# Patient Record
Sex: FEMALE | Race: WHITE | NOT HISPANIC OR LATINO
[De-identification: names, ages, dates, MRNs, and addresses within clinical notes are randomized per-mention and may not be internally consistent; named-entity substitution may affect disease eponyms.]

---

## 2017-02-22 ENCOUNTER — APPOINTMENT (OUTPATIENT)
Dept: PULMONOLOGY | Facility: CLINIC | Age: 57
End: 2017-02-22

## 2017-02-22 VITALS
WEIGHT: 148 LBS | DIASTOLIC BLOOD PRESSURE: 76 MMHG | BODY MASS INDEX: 27.94 KG/M2 | HEIGHT: 61 IN | SYSTOLIC BLOOD PRESSURE: 112 MMHG

## 2017-02-22 VITALS — OXYGEN SATURATION: 98 %

## 2017-02-22 DIAGNOSIS — Z86.79 PERSONAL HISTORY OF OTHER DISEASES OF THE CIRCULATORY SYSTEM: ICD-10-CM

## 2017-02-22 DIAGNOSIS — Z80.1 FAMILY HISTORY OF MALIGNANT NEOPLASM OF TRACHEA, BRONCHUS AND LUNG: ICD-10-CM

## 2017-03-06 ENCOUNTER — FORM ENCOUNTER (OUTPATIENT)
Age: 57
End: 2017-03-06

## 2017-03-07 ENCOUNTER — APPOINTMENT (OUTPATIENT)
Dept: CT IMAGING | Facility: CLINIC | Age: 57
End: 2017-03-07

## 2017-03-07 ENCOUNTER — OUTPATIENT (OUTPATIENT)
Dept: OUTPATIENT SERVICES | Facility: HOSPITAL | Age: 57
LOS: 1 days | End: 2017-03-07
Payer: COMMERCIAL

## 2017-03-07 DIAGNOSIS — Z00.8 ENCOUNTER FOR OTHER GENERAL EXAMINATION: ICD-10-CM

## 2017-03-07 PROCEDURE — G0297: CPT

## 2017-05-01 ENCOUNTER — APPOINTMENT (OUTPATIENT)
Dept: PULMONOLOGY | Facility: CLINIC | Age: 57
End: 2017-05-01

## 2017-05-01 VITALS — OXYGEN SATURATION: 97 % | SYSTOLIC BLOOD PRESSURE: 118 MMHG | HEART RATE: 84 BPM | DIASTOLIC BLOOD PRESSURE: 76 MMHG

## 2017-05-01 VITALS — BODY MASS INDEX: 28.97 KG/M2 | WEIGHT: 153.31 LBS

## 2017-05-01 DIAGNOSIS — R06.09 OTHER FORMS OF DYSPNEA: ICD-10-CM

## 2017-08-04 ENCOUNTER — APPOINTMENT (OUTPATIENT)
Dept: PULMONOLOGY | Facility: CLINIC | Age: 57
End: 2017-08-04
Payer: COMMERCIAL

## 2017-08-04 VITALS — WEIGHT: 170 LBS | BODY MASS INDEX: 32.12 KG/M2 | SYSTOLIC BLOOD PRESSURE: 110 MMHG | DIASTOLIC BLOOD PRESSURE: 78 MMHG

## 2017-08-04 VITALS — SYSTOLIC BLOOD PRESSURE: 112 MMHG | DIASTOLIC BLOOD PRESSURE: 70 MMHG

## 2017-08-04 VITALS — BODY MASS INDEX: 27.96 KG/M2 | WEIGHT: 148 LBS

## 2017-08-04 DIAGNOSIS — R05 COUGH: ICD-10-CM

## 2017-08-04 PROCEDURE — 99214 OFFICE O/P EST MOD 30 MIN: CPT | Mod: 25

## 2017-08-04 PROCEDURE — 94010 BREATHING CAPACITY TEST: CPT

## 2017-08-04 RX ORDER — UMECLIDINIUM BROMIDE AND VILANTEROL TRIFENATATE 62.5; 25 UG/1; UG/1
62.5-25 POWDER RESPIRATORY (INHALATION)
Refills: 0 | Status: DISCONTINUED | COMMUNITY
End: 2017-08-04

## 2017-08-08 ENCOUNTER — MEDICATION RENEWAL (OUTPATIENT)
Age: 57
End: 2017-08-08

## 2017-08-08 RX ORDER — ALBUTEROL SULFATE 90 UG/1
108 (90 BASE) AEROSOL, METERED RESPIRATORY (INHALATION)
Qty: 3 | Refills: 3 | Status: ACTIVE | COMMUNITY
Start: 1900-01-01 | End: 1900-01-01

## 2017-08-14 ENCOUNTER — OTHER (OUTPATIENT)
Age: 57
End: 2017-08-14

## 2017-08-14 RX ORDER — TIOTROPIUM BROMIDE 18 UG/1
18 CAPSULE ORAL; RESPIRATORY (INHALATION) DAILY
Qty: 1 | Refills: 5 | Status: DISCONTINUED | COMMUNITY
Start: 2017-08-04 | End: 2017-08-14

## 2017-08-22 ENCOUNTER — MEDICATION RENEWAL (OUTPATIENT)
Age: 57
End: 2017-08-22

## 2017-12-07 ENCOUNTER — APPOINTMENT (OUTPATIENT)
Dept: PULMONOLOGY | Facility: CLINIC | Age: 57
End: 2017-12-07

## 2018-10-04 ENCOUNTER — APPOINTMENT (OUTPATIENT)
Dept: PULMONOLOGY | Facility: CLINIC | Age: 58
End: 2018-10-04
Payer: COMMERCIAL

## 2018-10-04 VITALS
HEIGHT: 61 IN | HEART RATE: 74 BPM | DIASTOLIC BLOOD PRESSURE: 80 MMHG | OXYGEN SATURATION: 98 % | BODY MASS INDEX: 28.72 KG/M2 | SYSTOLIC BLOOD PRESSURE: 120 MMHG

## 2018-10-04 VITALS — WEIGHT: 152 LBS | BODY MASS INDEX: 28.72 KG/M2

## 2018-10-04 DIAGNOSIS — F17.200 NICOTINE DEPENDENCE, UNSPECIFIED, UNCOMPLICATED: ICD-10-CM

## 2018-10-04 DIAGNOSIS — J40 BRONCHITIS, NOT SPECIFIED AS ACUTE OR CHRONIC: ICD-10-CM

## 2018-10-04 PROCEDURE — 99214 OFFICE O/P EST MOD 30 MIN: CPT | Mod: 25

## 2018-10-04 PROCEDURE — 94010 BREATHING CAPACITY TEST: CPT

## 2018-10-21 ENCOUNTER — FORM ENCOUNTER (OUTPATIENT)
Age: 58
End: 2018-10-21

## 2018-10-22 ENCOUNTER — APPOINTMENT (OUTPATIENT)
Dept: CT IMAGING | Facility: CLINIC | Age: 58
End: 2018-10-22
Payer: COMMERCIAL

## 2018-10-22 ENCOUNTER — OUTPATIENT (OUTPATIENT)
Dept: OUTPATIENT SERVICES | Facility: HOSPITAL | Age: 58
LOS: 1 days | End: 2018-10-22
Payer: COMMERCIAL

## 2018-10-22 DIAGNOSIS — F17.200 NICOTINE DEPENDENCE, UNSPECIFIED, UNCOMPLICATED: ICD-10-CM

## 2018-10-22 PROCEDURE — G0297: CPT | Mod: 26

## 2018-10-22 PROCEDURE — G0297: CPT

## 2019-04-04 ENCOUNTER — APPOINTMENT (OUTPATIENT)
Dept: PULMONOLOGY | Facility: CLINIC | Age: 59
End: 2019-04-04

## 2019-10-21 ENCOUNTER — APPOINTMENT (OUTPATIENT)
Dept: PULMONOLOGY | Facility: CLINIC | Age: 59
End: 2019-10-21
Payer: COMMERCIAL

## 2019-10-21 VITALS
DIASTOLIC BLOOD PRESSURE: 80 MMHG | OXYGEN SATURATION: 98 % | BODY MASS INDEX: 28.47 KG/M2 | HEIGHT: 60 IN | SYSTOLIC BLOOD PRESSURE: 132 MMHG | HEART RATE: 85 BPM | WEIGHT: 145 LBS

## 2019-10-21 PROCEDURE — 94010 BREATHING CAPACITY TEST: CPT

## 2019-10-21 PROCEDURE — 99214 OFFICE O/P EST MOD 30 MIN: CPT | Mod: 25

## 2019-10-21 PROCEDURE — G0296 VISIT TO DETERM LDCT ELIG: CPT

## 2019-10-21 NOTE — REASON FOR VISIT
[Follow-Up] : a follow-up visit [COPD] : COPD [FreeTextEntry2] : nicotine dependence, encounter for lung cancer screening

## 2019-10-21 NOTE — PHYSICAL EXAM
[General Appearance - Well Developed] : well developed [General Appearance - Well Nourished] : well nourished [Normal Conjunctiva] : the conjunctiva exhibited no abnormalities [Normal Oropharynx] : normal oropharynx [Neck Appearance] : the appearance of the neck was normal [Neck Cervical Mass (___cm)] : no neck mass was observed [Jugular Venous Distention Increased] : there was no jugular-venous distention [Heart Rate And Rhythm] : heart rate and rhythm were normal [Heart Sounds] : normal S1 and S2 [Murmurs] : no murmurs present [Arterial Pulses Normal] : the arterial pulses were normal [Edema] : no peripheral edema present [] : no respiratory distress [Respiration, Rhythm And Depth] : normal respiratory rhythm and effort [Exaggerated Use Of Accessory Muscles For Inspiration] : no accessory muscle use [Auscultation Breath Sounds / Voice Sounds] : lungs were clear to auscultation bilaterally [Abdomen Soft] : soft [Abnormal Walk] : normal gait [Nail Clubbing] : no clubbing of the fingernails [Cyanosis, Localized] : no localized cyanosis [Skin Turgor] : normal skin turgor [No Focal Deficits] : no focal deficits [Oriented To Time, Place, And Person] : oriented to person, place, and time

## 2019-10-23 ENCOUNTER — RX RENEWAL (OUTPATIENT)
Age: 59
End: 2019-10-23

## 2019-10-24 ENCOUNTER — RX RENEWAL (OUTPATIENT)
Age: 59
End: 2019-10-24

## 2019-11-06 ENCOUNTER — FORM ENCOUNTER (OUTPATIENT)
Age: 59
End: 2019-11-06

## 2019-11-07 ENCOUNTER — OUTPATIENT (OUTPATIENT)
Dept: OUTPATIENT SERVICES | Facility: HOSPITAL | Age: 59
LOS: 1 days | End: 2019-11-07
Payer: COMMERCIAL

## 2019-11-07 ENCOUNTER — APPOINTMENT (OUTPATIENT)
Dept: CT IMAGING | Facility: CLINIC | Age: 59
End: 2019-11-07
Payer: COMMERCIAL

## 2019-11-07 VITALS — HEIGHT: 60 IN | BODY MASS INDEX: 28.07 KG/M2 | WEIGHT: 143 LBS

## 2019-11-07 DIAGNOSIS — F17.200 NICOTINE DEPENDENCE, UNSPECIFIED, UNCOMPLICATED: ICD-10-CM

## 2019-11-07 PROCEDURE — G0297: CPT | Mod: 26

## 2019-11-07 PROCEDURE — G0297: CPT

## 2019-11-07 NOTE — ASSESSMENT
[Discussed Risks and Advised to Quit Smoking] : Discussed risks and advised to quit smoking [Ready] : Patient is ready for cessation intervention

## 2019-11-07 NOTE — DATA REVIEWED
Called and let patient know [facility] [place] at  [facility].     [Lung Cancer Screening] : Patient underwent lung cancer screening [2] : 2 [TextBox_12] : 03/17 [TextBox_42] : 10/18

## 2019-11-07 NOTE — REASON FOR VISIT
[Annual Follow-Up] : an annual follow-up visit [Face-to-Face Visit] : face-to-face visit [Low-Dose CT Screening Discussion] : low-dose CT lung cancer screening discussion

## 2019-11-07 NOTE — HISTORY OF PRESENT ILLNESS
[TextBox_13] : Referred by Dr. Joe Vargas.\par \par Ms. GARCÍA is a 59 year year old female with a history of HTN, COPD and emphysema.\par \par She was seen in the office today for review of eligibility for, as well as, Low-Dose CT lung cancer screening.  Reviewed and confirmed that the patient meets screening eligibility criteria:\par \par 59 years old \par \par Smoking Status: Current Smoker\par \par Number of pack(s) per day: 1 ppd x 30 years, 3/4 ppd x 10 years\par Number of years smoked: 40\par Number of pack years smokin\par \par \par Ms. GARCÍA denies any symptoms of lung cancer, including new cough, change in cough, hemoptysis, and unintentional weight loss.\par \par Ms. GARCÍA denies any personal history of lung cancer.  Admits to lung cancer in a first degree relative, her father.  Denies any history of occupational exposures.

## 2019-11-07 NOTE — PLAN
[place] : [place] to [place] [phone] [FreeTextEntry1] : - Low Dose CT chest for lung cancer screening.\par \par - Encouraged smoking cessation\par \par \par

## 2019-11-11 ENCOUNTER — MEDICATION RENEWAL (OUTPATIENT)
Age: 59
End: 2019-11-11

## 2021-03-04 ENCOUNTER — APPOINTMENT (OUTPATIENT)
Dept: PULMONOLOGY | Facility: CLINIC | Age: 61
End: 2021-03-04
Payer: COMMERCIAL

## 2021-03-04 VITALS
BODY MASS INDEX: 28.51 KG/M2 | OXYGEN SATURATION: 98 % | HEART RATE: 87 BPM | DIASTOLIC BLOOD PRESSURE: 80 MMHG | WEIGHT: 146 LBS | RESPIRATION RATE: 16 BRPM | SYSTOLIC BLOOD PRESSURE: 140 MMHG

## 2021-03-04 DIAGNOSIS — J31.0 CHRONIC RHINITIS: ICD-10-CM

## 2021-03-04 PROCEDURE — 99072 ADDL SUPL MATRL&STAF TM PHE: CPT

## 2021-03-04 PROCEDURE — G0296 VISIT TO DETERM LDCT ELIG: CPT

## 2021-03-04 PROCEDURE — 99214 OFFICE O/P EST MOD 30 MIN: CPT

## 2021-03-04 NOTE — COUNSELING
[Risk of tobacco use and health benefits of smoking cessation discussed] : Risk of tobacco use and health benefits of smoking cessation discussed [Willing to Quit Smoking] : Willing to quit smoking [ - Annual Lung Cancer Screening/Share Decision Making Discussion] : Annual Lung Cancer Screening/Share Decision Making Discussion. (I have advised this patient to have a Low Dose CT (LDCT) scan of the lungs and have discussed the following with the patient in a shared decision making discussion:   Benefits of Detection and Early Treatment: There is adequate evidence that annual screening for lung cancer with LDCT in a population of high-risk persons can prevent a substantial number of lung cancer–related deaths. The magnitude of benefit depends on the individual patient's risk for lung cancer, as those who are at highest risk are most likely to benefit. Screening cannot prevent most lung cancer–related deaths, and does not replace smoking cessation. Harms of Detection and Early Intervention and Treatment: The harms associated with LDCT screening include false-negative and false-positive results, incidental findings, over diagnosis, and radiation exposure. False-positive LDCT results occur in a substantial proportion of screened persons; 95% of all positive results do not lead to a diagnosis of cancer. In a high-quality screening program, further imaging can resolve most false-positive results; however, some patients may require invasive procedures. Radiation harms, including cancer resulting from cumulative exposure to radiation, vary depending on the age at the start of screening; the number of scans received; and the person's exposure to other sources of radiation, particularly other medical imaging.)

## 2021-03-15 ENCOUNTER — NON-APPOINTMENT (OUTPATIENT)
Age: 61
End: 2021-03-15

## 2021-03-15 VITALS — HEIGHT: 60 IN | BODY MASS INDEX: 28.47 KG/M2 | WEIGHT: 145 LBS

## 2021-03-15 NOTE — HISTORY OF PRESENT ILLNESS
[TextBox_13] : Referred by Dr. Joe Vargas.\par \par Ms. GARCÍA is a 60 year old female with a history of HTN, COPD, high cholesterol and emphysema.\par \par She was called to review eligibility for Low-Dose CT lung cancer screening.  Reviewed and confirmed that the patient meets screening eligibility criteria:\par \par 60 years old \par \par Smoking Status: Current Smoker\par \par Number of pack(s) per day: 1 ppd x 30 years, 3/4 ppd x 12 years\par Number of years smoked: 42\par Number of pack years smokin\par \par Ms. GARCÍA denies any symptoms of lung cancer, including new cough, change in cough, hemoptysis, and unintentional weight loss.\par \par Ms. GARCÍA denies any personal history of lung cancer.  Admits to lung cancer in a first degree relative, her father.  Denies any history of occupational exposures.

## 2021-03-15 NOTE — PLAN
[place] : [place] [phone] [FreeTextEntry1] : - Low Dose CT chest for lung cancer screening.\par \par - Encouraged smoking cessation\par \par

## 2021-03-15 NOTE — DATA REVIEWED
[Lung Cancer Screening] : Patient underwent lung cancer screening [2] : 2 [TextBox_12] : 3/17 [TextBox_27] : 10/18 [TextBox_42] : 11/19

## 2021-03-17 ENCOUNTER — APPOINTMENT (OUTPATIENT)
Dept: CT IMAGING | Facility: CLINIC | Age: 61
End: 2021-03-17
Payer: COMMERCIAL

## 2021-03-17 ENCOUNTER — OUTPATIENT (OUTPATIENT)
Dept: OUTPATIENT SERVICES | Facility: HOSPITAL | Age: 61
LOS: 1 days | End: 2021-03-17
Payer: COMMERCIAL

## 2021-03-17 DIAGNOSIS — Z12.2 ENCOUNTER FOR SCREENING FOR MALIGNANT NEOPLASM OF RESPIRATORY ORGANS: ICD-10-CM

## 2021-03-17 PROCEDURE — 71271 CT THORAX LUNG CANCER SCR C-: CPT | Mod: 26

## 2021-03-17 PROCEDURE — 71271 CT THORAX LUNG CANCER SCR C-: CPT

## 2021-04-19 ENCOUNTER — APPOINTMENT (OUTPATIENT)
Dept: DISASTER EMERGENCY | Facility: CLINIC | Age: 61
End: 2021-04-19

## 2021-04-20 LAB — SARS-COV-2 N GENE NPH QL NAA+PROBE: NOT DETECTED

## 2021-04-22 ENCOUNTER — APPOINTMENT (OUTPATIENT)
Dept: PULMONOLOGY | Facility: CLINIC | Age: 61
End: 2021-04-22
Payer: COMMERCIAL

## 2021-04-22 VITALS
HEART RATE: 80 BPM | DIASTOLIC BLOOD PRESSURE: 88 MMHG | SYSTOLIC BLOOD PRESSURE: 138 MMHG | RESPIRATION RATE: 16 BRPM | OXYGEN SATURATION: 98 %

## 2021-04-22 VITALS — TEMPERATURE: 98.3 F | HEIGHT: 61 IN | BODY MASS INDEX: 27.38 KG/M2 | WEIGHT: 145 LBS

## 2021-04-22 PROCEDURE — 94010 BREATHING CAPACITY TEST: CPT

## 2021-04-22 PROCEDURE — 99213 OFFICE O/P EST LOW 20 MIN: CPT | Mod: 25

## 2021-04-22 PROCEDURE — 99072 ADDL SUPL MATRL&STAF TM PHE: CPT

## 2021-04-22 PROCEDURE — 99406 BEHAV CHNG SMOKING 3-10 MIN: CPT

## 2021-04-22 RX ORDER — ATORVASTATIN CALCIUM 80 MG/1
TABLET, FILM COATED ORAL
Refills: 0 | Status: ACTIVE | COMMUNITY

## 2022-03-18 ENCOUNTER — APPOINTMENT (OUTPATIENT)
Dept: PULMONOLOGY | Facility: CLINIC | Age: 62
End: 2022-03-18
Payer: COMMERCIAL

## 2022-03-18 VITALS — HEIGHT: 60 IN | BODY MASS INDEX: 29.45 KG/M2 | WEIGHT: 150 LBS

## 2022-03-18 VITALS
SYSTOLIC BLOOD PRESSURE: 114 MMHG | HEART RATE: 84 BPM | RESPIRATION RATE: 16 BRPM | DIASTOLIC BLOOD PRESSURE: 76 MMHG | OXYGEN SATURATION: 97 %

## 2022-03-18 PROCEDURE — 99407 BEHAV CHNG SMOKING > 10 MIN: CPT

## 2022-03-18 PROCEDURE — 94010 BREATHING CAPACITY TEST: CPT

## 2022-03-18 PROCEDURE — 99214 OFFICE O/P EST MOD 30 MIN: CPT | Mod: 25

## 2022-03-18 RX ORDER — FLUTICASONE PROPIONATE AND SALMETEROL 250; 50 UG/1; UG/1
250-50 POWDER RESPIRATORY (INHALATION)
Qty: 1 | Refills: 5 | Status: DISCONTINUED | COMMUNITY
Start: 2021-04-22 | End: 2022-03-18

## 2022-03-18 NOTE — CONSULT LETTER
[Dear  ___] : Dear  [unfilled], [FreeTextEntry1] : I had the pleasure of evaluating your patient, HUGO GARCÍA , in the office today.  Please review my consultation and evaluation report that follows below.  Please do not hesitate to call me if further information is necessary or if you wish to discuss ongoing care or diagnostic work-up.   \par I very much appreciate your referral and it is a privilege to be able to provide care for your patient.\par \par Sincerely,\par  \par Huber Stanton MD, MHCM, FACP, AGUEDA-C\par Pulmonary Medicine\par  of Medicine\par [facility]\par [email or web address]\par \par [facility] [place]\par [address]\[place]  [address]\par    Fax \par \par [facility]\par [address]\par [place], [place] \par \par

## 2022-03-18 NOTE — PROCEDURE
[FreeTextEntry1] : Spirometry shows obstruction with FEV1 1.44 69%\par FEV1% 59%\par Midexpir flow 28%\par \par This represents some deterioration from last spiriometry

## 2022-03-18 NOTE — ASSESSMENT
[FreeTextEntry1] : 62 yo woman smoker, with COPD\par Patient of Dr Vargas for 4 years\par Smoker of 30 packyear and still smoking about 1/2 ppd regularly\par \par Here for vicky\par On Breo 200 regularly, denies using albuterol\par \par Spirometry shows obstruction with FEV1 1.44 69%\par FEV1% 59%\par Midexpir flow 28%\par \par This represents some deterioration from last spiriometry\par \par Imp  \par 62 yo woman with COPD, airways obstruction\par Tobacco use\par \par The patient continues to smoke.\par  We had an extensive discussion regarding the multiple potential complications of chronic smoking including pulmonary disease, shortness of breath, emphysema, requirement for oxygen,  pulmonary neoplasm, cardiovascular disease, and cerebrovascular disease. We discussed potential strategies for decreasing smoking and smoking cessation for at least 12 minutes during the patient's visit.\par \par Recommend\par Order this year's lung cancer screening\par Renew meds Breo and albuterol\par encouraged to return her sooner to discuss smoking but she is reluctant\par Wishes to return in one year\par \par

## 2022-03-18 NOTE — HISTORY OF PRESENT ILLNESS
[TextBox_4] : 60 yo woman smoker, with COPD\par Patient of Dr Vargas for 4 years\par Smoker of 30 packyear and still smoking about 1/2 ppd regularly\par \par Here for vicky\par On Breo 200 regularly, denies using albuterol\par

## 2022-03-21 ENCOUNTER — APPOINTMENT (OUTPATIENT)
Dept: SURGERY | Facility: CLINIC | Age: 62
End: 2022-03-21
Payer: COMMERCIAL

## 2022-03-21 VITALS
HEIGHT: 61 IN | TEMPERATURE: 97.5 F | DIASTOLIC BLOOD PRESSURE: 83 MMHG | HEART RATE: 85 BPM | WEIGHT: 148 LBS | SYSTOLIC BLOOD PRESSURE: 149 MMHG | RESPIRATION RATE: 16 BRPM | BODY MASS INDEX: 27.94 KG/M2 | OXYGEN SATURATION: 96 %

## 2022-03-21 DIAGNOSIS — D17.30 BENIGN LIPOMATOUS NEOPLASM OF SKIN AND SUBCUTANEOUS TISSUE OF UNSPECIFIED SITES: ICD-10-CM

## 2022-03-21 DIAGNOSIS — R22.1 LOCALIZED SWELLING, MASS AND LUMP, NECK: ICD-10-CM

## 2022-03-21 PROCEDURE — 99203 OFFICE O/P NEW LOW 30 MIN: CPT

## 2022-03-21 RX ORDER — LOSARTAN POTASSIUM AND HYDROCHLOROTHIAZIDE 12.5; 1 MG/1; MG/1
100-12.5 TABLET ORAL
Qty: 90 | Refills: 0 | Status: ACTIVE | COMMUNITY
Start: 2021-10-11

## 2022-03-21 NOTE — ASSESSMENT
[FreeTextEntry1] : The patient is a 61 year old female with numerous epidermoid cysts of the neck and back.  The patient also has a mass at the base of the left neck in the supraclavicular area that is a likely lipoma.  We are going to proceed with an ultrasound to further assess.  The patient is interested in having the cysts removed.  We will review the ultrasound and schedule accordingly.  A total of 40 minutes was spent coordinating the patient's care.

## 2022-03-21 NOTE — CONSULT LETTER
[Dear  ___] : Dear  [unfilled], [Consult Letter:] : I had the pleasure of evaluating your patient, [unfilled]. [Please see my note below.] : Please see my note below. [Consult Closing:] : Thank you very much for allowing me to participate in the care of this patient.  If you have any questions, please do not hesitate to contact me. [Sincerely,] : Sincerely, [FreeTextEntry3] : Nawaf Orozco MD, FACS\par  \par Department of Surgery\par [facility]\par [facility]\par

## 2022-03-21 NOTE — HISTORY OF PRESENT ILLNESS
[de-identified] : The patient comes to the office in consultation by Dr. Tomasa Barnes for evaluation of numerous cysts of the neck and back.  The patient states that she has 2 cysts of the back that have been steadily getting larger and causing more discomfort.  She also has noted another one along the base of the left neck above the left collar bone. The patient states that she has another smaller one along the front of the left neck.

## 2022-03-28 ENCOUNTER — NON-APPOINTMENT (OUTPATIENT)
Age: 62
End: 2022-03-28

## 2022-03-28 VITALS — BODY MASS INDEX: 27.94 KG/M2 | HEIGHT: 61 IN | WEIGHT: 148 LBS

## 2022-03-28 NOTE — HISTORY OF PRESENT ILLNESS
[TextBox_13] : Referred by Dr. Huber Stanton.\par \par Ms. GARCÍA is a 61 year old female with a history of HTN, COPD and emphysema.\par \par Reviewed and confirmed that the patient meets screening eligibility criteria:\par \par 61 years old \par \par Smoking Status:  Current Smoker\par \par Number of pack(s) per day: 1 ppd x 30 years, 3/4 ppd x 13 years\par Number of years smoked: 43\par Number of pack years smokin.75\par \par No symptoms of lung cancer, including new cough, change in cough, hemoptysis, and unintentional weight loss.\par \par No personal history of lung cancer.  Admits to lung cancer in a first degree relative, her father.  No history of occupational exposures.

## 2022-03-30 ENCOUNTER — APPOINTMENT (OUTPATIENT)
Dept: ULTRASOUND IMAGING | Facility: CLINIC | Age: 62
End: 2022-03-30
Payer: COMMERCIAL

## 2022-03-30 ENCOUNTER — OUTPATIENT (OUTPATIENT)
Dept: OUTPATIENT SERVICES | Facility: HOSPITAL | Age: 62
LOS: 1 days | End: 2022-03-30
Payer: COMMERCIAL

## 2022-03-30 ENCOUNTER — APPOINTMENT (OUTPATIENT)
Dept: CT IMAGING | Facility: CLINIC | Age: 62
End: 2022-03-30
Payer: COMMERCIAL

## 2022-03-30 DIAGNOSIS — Z00.8 ENCOUNTER FOR OTHER GENERAL EXAMINATION: ICD-10-CM

## 2022-03-30 DIAGNOSIS — J44.9 CHRONIC OBSTRUCTIVE PULMONARY DISEASE, UNSPECIFIED: ICD-10-CM

## 2022-03-30 PROCEDURE — 76536 US EXAM OF HEAD AND NECK: CPT | Mod: 26

## 2022-03-30 PROCEDURE — 71271 CT THORAX LUNG CANCER SCR C-: CPT | Mod: 26

## 2022-03-30 PROCEDURE — 71271 CT THORAX LUNG CANCER SCR C-: CPT

## 2022-03-30 PROCEDURE — 76536 US EXAM OF HEAD AND NECK: CPT

## 2022-04-11 ENCOUNTER — APPOINTMENT (OUTPATIENT)
Dept: SURGERY | Facility: CLINIC | Age: 62
End: 2022-04-11
Payer: COMMERCIAL

## 2022-04-11 VITALS
WEIGHT: 150 LBS | RESPIRATION RATE: 16 BRPM | HEART RATE: 87 BPM | BODY MASS INDEX: 28.32 KG/M2 | TEMPERATURE: 97.4 F | SYSTOLIC BLOOD PRESSURE: 144 MMHG | HEIGHT: 61 IN | OXYGEN SATURATION: 95 % | DIASTOLIC BLOOD PRESSURE: 81 MMHG

## 2022-04-11 PROCEDURE — 99214 OFFICE O/P EST MOD 30 MIN: CPT

## 2022-04-11 NOTE — HISTORY OF PRESENT ILLNESS
[de-identified] : The patient returns to the office with no new complaints.  The patient wishes to have the cysts removed.  She has no fevers, chills, or drainage from the cysts.

## 2022-04-11 NOTE — ASSESSMENT
[FreeTextEntry1] : The patient is a 61 year old female with multiple cysts, one of which is overlying a left supraclavicular lipoma.  The patient wishes to have all 4 cysts removed.  The risks, benefits, and alternatives including the option of doing nothing to excision of a 4 skin cysts were discussed.  The potential complications including but not limited to infection, bleeding, wound dehiscence, and possible recurrence of the cysts were discussed.  The patient understands and wishes to proceed at the next available time.  A total of 30 minutes was spent coordinating the patient's care. \par \par \par \par

## 2022-04-11 NOTE — PHYSICAL EXAM
[JVD] : no jugular venous distention  [Purpura] : no purpura  [Petechiae] : no petechiae [Skin Ulcer] : no ulcer [Skin Induration] : no induration [Alert] : alert [Oriented to Person] : oriented to person [Oriented to Place] : oriented to place [Oriented to Time] : oriented to time [Calm] : calm [de-identified] : non toxic, in no acute distress  [de-identified] : NC/AT PERRL EOMI no scleral icterus  [de-identified] : trachea midline, no tenderness there is a mass base of the left neck in the supraclavicular area that appears to be a likely lipoma [de-identified] : no audible wheezing or stridor  [de-identified] : soft, non distended  [de-identified] : FROM of all extremities with no gross deformity or angulation  [de-identified] : there is a 3 cm epidermoid cyst of the upper mid back, a 2 cm cyst of the lower mid back, there is a less than one cm epidermoid cyst overlying the left supraclavicular fossa, there is a 1 cm cyst along the anterior left neck  [de-identified] : mood is calm

## 2022-04-11 NOTE — DATA REVIEWED
[FreeTextEntry1] : independent review of the neck ultrasound reveals a 2 by 4 mm cyst overlying a lipomatous mass in the left supraclavicular area

## 2022-05-04 ENCOUNTER — OUTPATIENT (OUTPATIENT)
Dept: OUTPATIENT SERVICES | Facility: HOSPITAL | Age: 62
LOS: 1 days | End: 2022-05-04
Payer: COMMERCIAL

## 2022-05-04 DIAGNOSIS — Z01.818 ENCOUNTER FOR OTHER PREPROCEDURAL EXAMINATION: ICD-10-CM

## 2022-05-04 LAB
ALBUMIN SERPL ELPH-MCNC: 4.5 G/DL — SIGNIFICANT CHANGE UP (ref 3.3–5.2)
ALP SERPL-CCNC: 98 U/L — SIGNIFICANT CHANGE UP (ref 40–120)
ALT FLD-CCNC: 32 U/L — SIGNIFICANT CHANGE UP
ANION GAP SERPL CALC-SCNC: 14 MMOL/L — SIGNIFICANT CHANGE UP (ref 5–17)
APTT BLD: 29 SEC — SIGNIFICANT CHANGE UP (ref 27.5–35.5)
AST SERPL-CCNC: 32 U/L — HIGH
BASOPHILS # BLD AUTO: 0.11 K/UL — SIGNIFICANT CHANGE UP (ref 0–0.2)
BASOPHILS NFR BLD AUTO: 1.1 % — SIGNIFICANT CHANGE UP (ref 0–2)
BILIRUB SERPL-MCNC: 0.3 MG/DL — LOW (ref 0.4–2)
BUN SERPL-MCNC: 10 MG/DL — SIGNIFICANT CHANGE UP (ref 8–20)
CALCIUM SERPL-MCNC: 9.4 MG/DL — SIGNIFICANT CHANGE UP (ref 8.6–10.2)
CHLORIDE SERPL-SCNC: 99 MMOL/L — SIGNIFICANT CHANGE UP (ref 98–107)
CO2 SERPL-SCNC: 25 MMOL/L — SIGNIFICANT CHANGE UP (ref 22–29)
CREAT SERPL-MCNC: 0.59 MG/DL — SIGNIFICANT CHANGE UP (ref 0.5–1.3)
EGFR: 102 ML/MIN/1.73M2 — SIGNIFICANT CHANGE UP
EOSINOPHIL # BLD AUTO: 0.18 K/UL — SIGNIFICANT CHANGE UP (ref 0–0.5)
EOSINOPHIL NFR BLD AUTO: 1.8 % — SIGNIFICANT CHANGE UP (ref 0–6)
GLUCOSE SERPL-MCNC: 98 MG/DL — SIGNIFICANT CHANGE UP (ref 70–99)
HCT VFR BLD CALC: 45 % — SIGNIFICANT CHANGE UP (ref 34.5–45)
HGB BLD-MCNC: 15.1 G/DL — SIGNIFICANT CHANGE UP (ref 11.5–15.5)
IMM GRANULOCYTES NFR BLD AUTO: 0.3 % — SIGNIFICANT CHANGE UP (ref 0–1.5)
INR BLD: 0.94 RATIO — SIGNIFICANT CHANGE UP (ref 0.88–1.16)
LYMPHOCYTES # BLD AUTO: 2.49 K/UL — SIGNIFICANT CHANGE UP (ref 1–3.3)
LYMPHOCYTES # BLD AUTO: 25.2 % — SIGNIFICANT CHANGE UP (ref 13–44)
MCHC RBC-ENTMCNC: 32.5 PG — SIGNIFICANT CHANGE UP (ref 27–34)
MCHC RBC-ENTMCNC: 33.6 GM/DL — SIGNIFICANT CHANGE UP (ref 32–36)
MCV RBC AUTO: 96.8 FL — SIGNIFICANT CHANGE UP (ref 80–100)
MONOCYTES # BLD AUTO: 0.79 K/UL — SIGNIFICANT CHANGE UP (ref 0–0.9)
MONOCYTES NFR BLD AUTO: 8 % — SIGNIFICANT CHANGE UP (ref 2–14)
NEUTROPHILS # BLD AUTO: 6.28 K/UL — SIGNIFICANT CHANGE UP (ref 1.8–7.4)
NEUTROPHILS NFR BLD AUTO: 63.6 % — SIGNIFICANT CHANGE UP (ref 43–77)
PLATELET # BLD AUTO: 272 K/UL — SIGNIFICANT CHANGE UP (ref 150–400)
POTASSIUM SERPL-MCNC: 4.2 MMOL/L — SIGNIFICANT CHANGE UP (ref 3.5–5.3)
POTASSIUM SERPL-SCNC: 4.2 MMOL/L — SIGNIFICANT CHANGE UP (ref 3.5–5.3)
PROT SERPL-MCNC: 7.4 G/DL — SIGNIFICANT CHANGE UP (ref 6.6–8.7)
PROTHROM AB SERPL-ACNC: 10.9 SEC — SIGNIFICANT CHANGE UP (ref 10.5–13.4)
RBC # BLD: 4.65 M/UL — SIGNIFICANT CHANGE UP (ref 3.8–5.2)
RBC # FLD: 11.9 % — SIGNIFICANT CHANGE UP (ref 10.3–14.5)
SODIUM SERPL-SCNC: 138 MMOL/L — SIGNIFICANT CHANGE UP (ref 135–145)
WBC # BLD: 9.88 K/UL — SIGNIFICANT CHANGE UP (ref 3.8–10.5)
WBC # FLD AUTO: 9.88 K/UL — SIGNIFICANT CHANGE UP (ref 3.8–10.5)

## 2022-05-04 PROCEDURE — 85610 PROTHROMBIN TIME: CPT

## 2022-05-04 PROCEDURE — 85025 COMPLETE CBC W/AUTO DIFF WBC: CPT

## 2022-05-04 PROCEDURE — 93005 ELECTROCARDIOGRAM TRACING: CPT

## 2022-05-04 PROCEDURE — 80053 COMPREHEN METABOLIC PANEL: CPT

## 2022-05-04 PROCEDURE — 36415 COLL VENOUS BLD VENIPUNCTURE: CPT

## 2022-05-04 PROCEDURE — G0463: CPT

## 2022-05-04 PROCEDURE — 93010 ELECTROCARDIOGRAM REPORT: CPT

## 2022-05-04 PROCEDURE — 85730 THROMBOPLASTIN TIME PARTIAL: CPT

## 2022-05-09 ENCOUNTER — APPOINTMENT (OUTPATIENT)
Dept: PULMONOLOGY | Facility: CLINIC | Age: 62
End: 2022-05-09
Payer: COMMERCIAL

## 2022-05-09 VITALS
RESPIRATION RATE: 16 BRPM | SYSTOLIC BLOOD PRESSURE: 142 MMHG | OXYGEN SATURATION: 98 % | WEIGHT: 145 LBS | BODY MASS INDEX: 27.38 KG/M2 | DIASTOLIC BLOOD PRESSURE: 78 MMHG | HEART RATE: 82 BPM | HEIGHT: 61 IN

## 2022-05-09 PROCEDURE — 99214 OFFICE O/P EST MOD 30 MIN: CPT

## 2022-05-09 RX ORDER — LOSARTAN POTASSIUM AND HYDROCHLOROTHIAZIDE 25; 100 MG/1; MG/1
100-25 TABLET ORAL
Refills: 0 | Status: DISCONTINUED | COMMUNITY
End: 2022-05-09

## 2022-05-09 RX ORDER — FLUTICASONE FUROATE AND VILANTEROL TRIFENATATE 200; 25 UG/1; UG/1
200-25 POWDER RESPIRATORY (INHALATION) DAILY
Qty: 3 | Refills: 0 | Status: DISCONTINUED | COMMUNITY
Start: 2017-08-22 | End: 2022-05-09

## 2022-05-09 NOTE — PHYSICAL EXAM
[II] : Mallampati Class: II [No Resp Distress] : no resp distress [Clear to Auscultation Bilaterally] : clear to auscultation bilaterally

## 2022-05-09 NOTE — ASSESSMENT
[FreeTextEntry1] : Patient with mild to moderate COPD cleared for surgery. She will continue using her Breo and will take it the night before surgery.\par \par Smoking cessation advised. Followup 6 months.

## 2022-05-09 NOTE — REASON FOR VISIT
[Follow-Up] : a follow-up visit [COPD] : COPD [Pre-op Risk Stratification] : pre-op risk stratification

## 2022-05-09 NOTE — HISTORY OF PRESENT ILLNESS
[TextBox_4] : Patient going tomorrow for removal of several cysts on her neck. This will be done in the surgery center. Pulmonary clearance was requested. The patient has mild to moderate COPD. Generally has been feeling well on Breo which she takes at night. Denies coughing or wheezing or need for rescue inhaler recently. She does have some postnasal drip for which she takes fluticasone nasal spray. Smoking about one half pack of cigarettes per day.

## 2022-05-10 ENCOUNTER — RESULT REVIEW (OUTPATIENT)
Age: 62
End: 2022-05-10

## 2022-05-10 ENCOUNTER — APPOINTMENT (OUTPATIENT)
Dept: SURGERY | Facility: AMBULATORY SURGERY CENTER | Age: 62
End: 2022-05-10
Payer: COMMERCIAL

## 2022-05-10 PROCEDURE — 11421 EXC H-F-NK-SP B9+MARG 0.6-1: CPT

## 2022-05-10 PROCEDURE — 11403 EXC TR-EXT B9+MARG 2.1-3CM: CPT

## 2022-05-10 PROCEDURE — 11401 EXC TR-EXT B9+MARG 0.6-1 CM: CPT

## 2022-05-23 ENCOUNTER — APPOINTMENT (OUTPATIENT)
Dept: SURGERY | Facility: CLINIC | Age: 62
End: 2022-05-23
Payer: COMMERCIAL

## 2022-05-23 VITALS
BODY MASS INDEX: 27.75 KG/M2 | SYSTOLIC BLOOD PRESSURE: 138 MMHG | HEART RATE: 83 BPM | OXYGEN SATURATION: 96 % | DIASTOLIC BLOOD PRESSURE: 81 MMHG | HEIGHT: 61 IN | WEIGHT: 147 LBS | RESPIRATION RATE: 14 BRPM | TEMPERATURE: 97.5 F

## 2022-05-23 PROCEDURE — 99024 POSTOP FOLLOW-UP VISIT: CPT

## 2022-05-23 NOTE — HISTORY OF PRESENT ILLNESS
[de-identified] : The patient returns to the office with complaints of mild bloody drainage from the upper back excision site. She has no pain, no fevers or chills, and states overall she is pleased thus far.

## 2022-05-23 NOTE — ASSESSMENT
[FreeTextEntry1] : The patient is stable and doing overall well following excision of 4 cysts.  She will follow up in 10 days for suture removal.

## 2022-05-23 NOTE — PHYSICAL EXAM
[JVD] : no jugular venous distention  [Purpura] : no purpura  [Petechiae] : no petechiae [Skin Ulcer] : no ulcer [Skin Induration] : no induration [Alert] : alert [Oriented to Person] : oriented to person [Oriented to Place] : oriented to place [Oriented to Time] : oriented to time [Calm] : calm [de-identified] : non toxic, in no acute distress  [de-identified] : NC/AT PERRL EOMI no scleral icterus  [de-identified] : trachea midline, no tenderness there is a mass base of the left neck in the supraclavicular area that appears to be a likely lipoma [de-identified] : no audible wheezing or stridor  [de-identified] : soft, non distended  [de-identified] : FROM of all extremities with no gross deformity or angulation  [de-identified] : the 2 surgical incisions of the left neck are healed without infection or drainage.  the 2 of the back have sutures in place without evidence of infection [de-identified] : mood is calm

## 2022-06-01 ENCOUNTER — APPOINTMENT (OUTPATIENT)
Dept: SURGERY | Facility: CLINIC | Age: 62
End: 2022-06-01
Payer: COMMERCIAL

## 2022-06-01 VITALS
DIASTOLIC BLOOD PRESSURE: 83 MMHG | HEART RATE: 86 BPM | WEIGHT: 149 LBS | RESPIRATION RATE: 16 BRPM | BODY MASS INDEX: 28.13 KG/M2 | OXYGEN SATURATION: 96 % | HEIGHT: 61 IN | TEMPERATURE: 97.9 F | SYSTOLIC BLOOD PRESSURE: 137 MMHG

## 2022-06-01 PROCEDURE — 99212 OFFICE O/P EST SF 10 MIN: CPT

## 2022-06-01 NOTE — HISTORY OF PRESENT ILLNESS
[de-identified] : The patient returns to the office with no new complaints. She has no pain, no fevers or chills.

## 2022-06-01 NOTE — PHYSICAL EXAM
[JVD] : no jugular venous distention  [Purpura] : no purpura  [Petechiae] : no petechiae [Skin Ulcer] : no ulcer [Skin Induration] : no induration [Alert] : alert [Oriented to Person] : oriented to person [Oriented to Place] : oriented to place [Oriented to Time] : oriented to time [Calm] : calm [de-identified] : non toxic, in no acute distress  [de-identified] : NC/AT PERRL EOMI no scleral icterus  [de-identified] : trachea midline, no tenderness there is a mass base of the left neck in the supraclavicular area that appears to be a likely lipoma [de-identified] : no audible wheezing or stridor  [de-identified] : soft, non distended  [de-identified] : FROM of all extremities with no gross deformity or angulation  [de-identified] : the 2 surgical incisions of the left neck are healed without infection or drainage,  the upper back excision site is with mild separation and no signs of infection, the lower back site is healed without infection  [de-identified] : mood is calm

## 2022-06-01 NOTE — ASSESSMENT
[FreeTextEntry1] : The patient overall is stable and doing well following excision of multiple cysts.  The patient will follow up in 2 weeks to follow up the wound of the upper back.

## 2022-06-15 ENCOUNTER — APPOINTMENT (OUTPATIENT)
Dept: SURGERY | Facility: CLINIC | Age: 62
End: 2022-06-15
Payer: COMMERCIAL

## 2022-06-15 VITALS
DIASTOLIC BLOOD PRESSURE: 64 MMHG | WEIGHT: 147 LBS | BODY MASS INDEX: 27.75 KG/M2 | SYSTOLIC BLOOD PRESSURE: 116 MMHG | TEMPERATURE: 96.9 F | HEIGHT: 61 IN | RESPIRATION RATE: 14 BRPM | HEART RATE: 84 BPM | OXYGEN SATURATION: 98 %

## 2022-06-15 DIAGNOSIS — L72.0 EPIDERMAL CYST: ICD-10-CM

## 2022-06-15 PROCEDURE — 99212 OFFICE O/P EST SF 10 MIN: CPT

## 2022-06-15 NOTE — PHYSICAL EXAM
[JVD] : no jugular venous distention  [Purpura] : no purpura  [Petechiae] : no petechiae [Skin Ulcer] : no ulcer [Skin Induration] : no induration [Alert] : alert [Oriented to Person] : oriented to person [Oriented to Place] : oriented to place [Oriented to Time] : oriented to time [Calm] : calm [de-identified] : non toxic, in no acute distress  [de-identified] : NC/AT PERRL EOMI no scleral icterus  [de-identified] : trachea midline, no tenderness there is a mass base of the left neck in the supraclavicular area that appears to be a likely lipoma [de-identified] : no audible wheezing or stridor  [de-identified] : soft, non distended  [de-identified] : FROM of all extremities with no gross deformity or angulation  [de-identified] : the 2 surgical incisions of the left neck remain healed without infection or drainage,  the upper back excision site is with granulation tissue at the base and no signs of infection [de-identified] : mood is calm

## 2022-06-15 NOTE — ASSESSMENT
[FreeTextEntry1] : The patient is overall stable and doing well following excision of multiple cysts.  The patient will follow up in 2 weeks to reevaluate the upper back wound.

## 2022-07-06 ENCOUNTER — APPOINTMENT (OUTPATIENT)
Dept: SURGERY | Facility: CLINIC | Age: 62
End: 2022-07-06

## 2022-07-06 VITALS
TEMPERATURE: 97.5 F | HEIGHT: 61 IN | SYSTOLIC BLOOD PRESSURE: 120 MMHG | WEIGHT: 146 LBS | RESPIRATION RATE: 16 BRPM | HEART RATE: 86 BPM | BODY MASS INDEX: 27.56 KG/M2 | OXYGEN SATURATION: 96 % | DIASTOLIC BLOOD PRESSURE: 76 MMHG

## 2022-07-06 DIAGNOSIS — L72.3 SEBACEOUS CYST: ICD-10-CM

## 2022-07-06 PROCEDURE — 99212 OFFICE O/P EST SF 10 MIN: CPT

## 2022-07-06 NOTE — ASSESSMENT
[FreeTextEntry1] : The patient is a 61 year old female who is stable and doing well following excision of 4 cysts.  The wounds are all healed.  She will follow up as needed.  A total of 15 minutes was spent coordinating the care of the patient.

## 2022-07-06 NOTE — HISTORY OF PRESENT ILLNESS
[de-identified] : The patient comes to the office with no complaints. She has no pain, no drainage from the wound on the back.  She states that the back wound has closed.

## 2022-07-06 NOTE — PHYSICAL EXAM
[JVD] : no jugular venous distention  [Purpura] : no purpura  [Petechiae] : no petechiae [Skin Ulcer] : no ulcer [Skin Induration] : no induration [Alert] : alert [Oriented to Person] : oriented to person [Oriented to Place] : oriented to place [Oriented to Time] : oriented to time [Calm] : calm [de-identified] : non toxic, in no acute distress  [de-identified] : NC/AT PERRL EOMI no scleral icterus  [de-identified] : trachea midline, no tenderness there is a mass base of the left neck in the supraclavicular area that appears to be a likely lipoma [de-identified] : no audible wheezing or stridor  [de-identified] : soft, non distended  [de-identified] : FROM of all extremities with no gross deformity or angulation  [de-identified] : the 2 surgical incisions of the left neck remain healed without infection or drainage,  the upper back excision site is closed [de-identified] : mood is calm

## 2022-10-25 NOTE — REASON FOR VISIT
General Surgery Post op Check    Pt seen and examined without complaints. Pain is controlled. Denies SOB/CP/N/V.     Vital Signs Last 24 Hrs  T(C): 36.4 (25 Oct 2022 20:00), Max: 36.6 (25 Oct 2022 08:09)  T(F): 97.5 (25 Oct 2022 20:00), Max: 97.9 (25 Oct 2022 08:09)  HR: 82 (25 Oct 2022 20:00) (79 - 86)  BP: 153/76 (25 Oct 2022 20:00) (122/60 - 163/92)  BP(mean): 113 (25 Oct 2022 18:30) (86 - 113)  RR: 16 (25 Oct 2022 20:00) (16 - 18)  SpO2: 96% (25 Oct 2022 20:00) (95% - 100%)    Parameters below as of 25 Oct 2022 19:00  Patient On (Oxygen Delivery Method): room air        I&O's Summary    25 Oct 2022 07:01  -  25 Oct 2022 20:58  --------------------------------------------------------  IN: 650 mL / OUT: 500 mL / NET: 150 mL        Physical Exam  Gen: NAD, A&Ox3  Pulm: No respiratory distress, no subcostal retractions  CV: RRR, no JVD  Abd: Soft, NT, ND, incisions c/d/i  Extremities:  FROM, warm and well perfused, equal bilateral muscle strength      A/P: 53y Female s/p ventral hernia repair  -DVT prophylaxis w/ SQH.   -Strict I&O's  -Analgesia and antiemetics as needed  -Diet: reg  -Dispo: floor      [facility], PGY-1 [Follow-Up] : a follow-up visit [COPD] : COPD [Nicotine Dependence] : nicotine dependence

## 2022-11-07 ENCOUNTER — APPOINTMENT (OUTPATIENT)
Dept: OBGYN | Facility: CLINIC | Age: 62
End: 2022-11-07

## 2022-11-07 VITALS
SYSTOLIC BLOOD PRESSURE: 145 MMHG | WEIGHT: 148 LBS | HEIGHT: 61 IN | DIASTOLIC BLOOD PRESSURE: 82 MMHG | HEART RATE: 92 BPM | BODY MASS INDEX: 27.94 KG/M2

## 2022-11-07 DIAGNOSIS — Z00.00 ENCOUNTER FOR GENERAL ADULT MEDICAL EXAMINATION W/OUT ABNORMAL FINDINGS: ICD-10-CM

## 2022-11-07 PROCEDURE — 99396 PREV VISIT EST AGE 40-64: CPT

## 2022-11-07 NOTE — HISTORY OF PRESENT ILLNESS
[FreeTextEntry1] : Patient is 63-year-old  3 para 2-0-1-2 last menstrual period age 38\par Patient presents for annual visit denies any complaint

## 2022-11-07 NOTE — PLAN
[FreeTextEntry1] : Patient is a 62-year-old  3 para 2-0-1-2 last menstrual period age 38\par Patient presents for annual visit,,, denies any complaints\par Physical exam reveals a well-developed well-nourished female no apparent distress,,, BMI 28\par Heart regular rhythm and rate, lungs clear, breast no masses nontender no skin change or nipple discharge no adenopathy, abdomen soft nontender no organomegaly.\par Pelvic exam shows normal female external genitalia atrophic, vagina was atrophic, cervix flush with vaginal vault nontender, uterus anteverted small nontender, adnexa no mass nontender.\par Pap smear performed\par Patient will be given a prescription for mammogram\par Patient states he had a bone density done in  with negative results\par Patient states that she had colonoscopy done at age 50 and patient is advised to please schedule a follow-up colonoscopy\par Essential benign annual exam\par Follow-up 1 year prior to that as needed

## 2022-11-07 NOTE — PHYSICAL EXAM
[Chaperone Present] : A chaperone was present in the examining room during all aspects of the physical examination [Appropriately responsive] : appropriately responsive [Alert] : alert [No Acute Distress] : no acute distress [No Lymphadenopathy] : no lymphadenopathy [Regular Rate Rhythm] : regular rate rhythm [No Murmurs] : no murmurs [Clear to Auscultation B/L] : clear to auscultation bilaterally [Soft] : soft [Non-tender] : non-tender [Non-distended] : non-distended [No HSM] : No HSM [No Lesions] : no lesions [No Mass] : no mass [Oriented x3] : oriented x3 [Examination Of The Breasts] : a normal appearance [No Masses] : no breast masses were palpable [Vulvar Atrophy] : vulvar atrophy [Labia Majora] : normal [Labia Minora] : normal [Atrophy] : atrophy [Normal] : normal [Anteversion] : anteverted [Uterine Adnexae] : normal [FreeTextEntry6] : No masses, nontender, no skin changes, nipple discharge, no erythema.  No adenopathy [Tenderness] : nontender [Mass ___ cm] : no uterine mass was palpated [FreeTextEntry1] : Severe atrophy [FreeTextEntry4] : Severe atrophy [FreeTextEntry5] : Flush with vaginal vault

## 2022-11-11 ENCOUNTER — APPOINTMENT (OUTPATIENT)
Dept: PULMONOLOGY | Facility: CLINIC | Age: 62
End: 2022-11-11

## 2023-01-25 ENCOUNTER — APPOINTMENT (OUTPATIENT)
Dept: PULMONOLOGY | Facility: CLINIC | Age: 63
End: 2023-01-25
Payer: COMMERCIAL

## 2023-01-25 VITALS
HEART RATE: 100 BPM | DIASTOLIC BLOOD PRESSURE: 80 MMHG | OXYGEN SATURATION: 96 % | SYSTOLIC BLOOD PRESSURE: 130 MMHG | RESPIRATION RATE: 16 BRPM

## 2023-01-25 VITALS — WEIGHT: 148 LBS | HEIGHT: 61 IN | BODY MASS INDEX: 27.94 KG/M2

## 2023-01-25 DIAGNOSIS — F17.200 NICOTINE DEPENDENCE, UNSPECIFIED, UNCOMPLICATED: ICD-10-CM

## 2023-01-25 PROCEDURE — 99214 OFFICE O/P EST MOD 30 MIN: CPT | Mod: 25

## 2023-01-25 PROCEDURE — 94010 BREATHING CAPACITY TEST: CPT

## 2023-01-25 PROCEDURE — 94727 GAS DIL/WSHOT DETER LNG VOL: CPT

## 2023-01-25 PROCEDURE — 94729 DIFFUSING CAPACITY: CPT

## 2023-01-25 PROCEDURE — G0296 VISIT TO DETERM LDCT ELIG: CPT

## 2023-01-25 PROCEDURE — 99406 BEHAV CHNG SMOKING 3-10 MIN: CPT

## 2023-01-25 PROCEDURE — 85018 HEMOGLOBIN: CPT | Mod: QW

## 2023-01-25 RX ORDER — FLUTICASONE PROPIONATE 50 UG/1
50 SPRAY, METERED NASAL
Refills: 0 | Status: ACTIVE | COMMUNITY

## 2023-01-25 NOTE — ASSESSMENT
[FreeTextEntry1] : Mild COPD.  There has been slow progression over the past several years due to ongoing cigarette smoking.  We discussed smoking cessation again today.  Patient is due for annual lung cancer screening CT scan which will be done in March and we will follow that up by phone.  Otherwise follow-up here in 1 year.

## 2023-01-25 NOTE — HISTORY OF PRESENT ILLNESS
[TextBox_4] : Continues to smoke.  Has not been able to cut down.  Denies any increase in shortness of breath cough or wheeze.  Remains on Breo.  Rare use of rescue inhaler.

## 2023-01-25 NOTE — COUNSELING
[Cessation strategies including cessation program discussed] : Cessation strategies including cessation program discussed [Use of nicotine replacement therapies and other medications discussed] : Use of nicotine replacement therapies and other medications discussed [Encouraged to pick a quit date and identify support needed to quit] : Encouraged to pick a quit date and identify support needed to quit [Yes] : Willing to quit smoking [ - Annual Lung Cancer Screening/Share Decision Making Discussion] : Annual Lung Cancer Screening/Share Decision Making Discussion. (I have advised this patient to have a Low Dose CT (LDCT) scan of the lungs and have discussed the following with the patient in a shared decision making discussion:   Benefits of Detection and Early Treatment: There is adequate evidence that annual screening for lung cancer with LDCT in a population of high-risk persons can prevent a substantial number of lung cancer–related deaths. The magnitude of benefit depends on the individual patient's risk for lung cancer, as those who are at highest risk are most likely to benefit. Screening cannot prevent most lung cancer–related deaths, and does not replace smoking cessation. Harms of Detection and Early Intervention and Treatment: The harms associated with LDCT screening include false-negative and false-positive results, incidental findings, over diagnosis, and radiation exposure. False-positive LDCT results occur in a substantial proportion of screened persons; 95% of all positive results do not lead to a diagnosis of cancer. In a high-quality screening program, further imaging can resolve most false-positive results; however, some patients may require invasive procedures. Radiation harms, including cancer resulting from cumulative exposure to radiation, vary depending on the age at the start of screening; the number of scans received; and the person's exposure to other sources of radiation, particularly other medical imaging.) [FreeTextEntry1] : 5

## 2023-01-25 NOTE — PHYSICAL EXAM
[II] : Mallampati Class: II [No Resp Distress] : no resp distress [Clear to Auscultation Bilaterally] : clear to auscultation bilaterally [TextBox_68] : Diminished breath sounds bilaterally

## 2023-01-25 NOTE — PROCEDURE
[FreeTextEntry1] : Pulmonary function studies again show mild to moderate obstruction.  Findings are slightly down from what was seen last year with FEV1 67% predicted and vital capacity 87%.  Hyperinflation and a low diffusing capacity was noted.

## 2023-04-13 ENCOUNTER — NON-APPOINTMENT (OUTPATIENT)
Age: 63
End: 2023-04-13

## 2023-04-13 VITALS — HEIGHT: 61 IN | BODY MASS INDEX: 27.94 KG/M2 | WEIGHT: 148 LBS

## 2023-04-13 NOTE — HISTORY OF PRESENT ILLNESS
[Current] : Current [TextBox_13] : Ms. GARCÍA is a 62 year old female with a history of HTN, COPD and emphysema.  Reviewed and confirmed that the patient meets screening eligibility criteria:\par \par 62 years old \par \par Smoking Status: Current Smoker\par \par Number of pack(s) per day: 1 ppd x 30 years, 3/4 ppd x 14 years\par Number of years smoked: 44\par Number of pack years smokin\par \par No symptoms of lung cancer, including new cough, change in cough, hemoptysis, and unintentional weight loss.\par \par No personal history of lung cancer. History of lung cancer in a first degree relative, her father. No history of occupational exposures.  [PacksperYear] : 41

## 2023-04-19 ENCOUNTER — APPOINTMENT (OUTPATIENT)
Dept: CT IMAGING | Facility: CLINIC | Age: 63
End: 2023-04-19
Payer: COMMERCIAL

## 2023-04-19 ENCOUNTER — OUTPATIENT (OUTPATIENT)
Dept: OUTPATIENT SERVICES | Facility: HOSPITAL | Age: 63
LOS: 1 days | End: 2023-04-19
Payer: COMMERCIAL

## 2023-04-19 DIAGNOSIS — F17.200 NICOTINE DEPENDENCE, UNSPECIFIED, UNCOMPLICATED: ICD-10-CM

## 2023-04-19 DIAGNOSIS — J44.9 CHRONIC OBSTRUCTIVE PULMONARY DISEASE, UNSPECIFIED: ICD-10-CM

## 2023-04-19 PROCEDURE — 71271 CT THORAX LUNG CANCER SCR C-: CPT

## 2023-04-19 PROCEDURE — 71271 CT THORAX LUNG CANCER SCR C-: CPT | Mod: 26

## 2023-11-09 ENCOUNTER — APPOINTMENT (OUTPATIENT)
Dept: OBGYN | Facility: CLINIC | Age: 63
End: 2023-11-09
Payer: COMMERCIAL

## 2023-11-09 VITALS
HEIGHT: 61 IN | BODY MASS INDEX: 27.56 KG/M2 | SYSTOLIC BLOOD PRESSURE: 162 MMHG | DIASTOLIC BLOOD PRESSURE: 89 MMHG | HEART RATE: 91 BPM | WEIGHT: 146 LBS

## 2023-11-09 DIAGNOSIS — Z01.818 ENCOUNTER FOR OTHER PREPROCEDURAL EXAMINATION: ICD-10-CM

## 2023-11-09 DIAGNOSIS — Z12.2 ENCOUNTER FOR SCREENING FOR MALIGNANT NEOPLASM OF RESPIRATORY ORGANS: ICD-10-CM

## 2023-11-09 DIAGNOSIS — Z12.39 ENCOUNTER FOR OTHER SCREENING FOR MALIGNANT NEOPLASM OF BREAST: ICD-10-CM

## 2023-11-09 DIAGNOSIS — Z01.811 ENCOUNTER FOR PREPROCEDURAL RESPIRATORY EXAMINATION: ICD-10-CM

## 2023-11-09 DIAGNOSIS — Z12.31 ENCOUNTER FOR SCREENING MAMMOGRAM FOR MALIGNANT NEOPLASM OF BREAST: ICD-10-CM

## 2023-11-09 DIAGNOSIS — Z20.828 CONTACT WITH AND (SUSPECTED) EXPOSURE TO OTHER VIRAL COMMUNICABLE DISEASES: ICD-10-CM

## 2023-11-09 DIAGNOSIS — Z11.51 ENCOUNTER FOR SCREENING FOR HUMAN PAPILLOMAVIRUS (HPV): ICD-10-CM

## 2023-11-09 DIAGNOSIS — Z12.4 ENCOUNTER FOR SCREENING FOR MALIGNANT NEOPLASM OF CERVIX: ICD-10-CM

## 2023-11-09 DIAGNOSIS — Z01.419 ENCOUNTER FOR GYNECOLOGICAL EXAMINATION (GENERAL) (ROUTINE) W/OUT ABNORMAL FINDINGS: ICD-10-CM

## 2023-11-09 DIAGNOSIS — R92.30 INCONCLUSIVE MAMMOGRAM: ICD-10-CM

## 2023-11-09 DIAGNOSIS — R92.2 INCONCLUSIVE MAMMOGRAM: ICD-10-CM

## 2023-11-09 PROCEDURE — 99396 PREV VISIT EST AGE 40-64: CPT

## 2023-11-14 LAB — CYTOLOGY CVX/VAG DOC THIN PREP: NORMAL

## 2024-01-29 ENCOUNTER — RX RENEWAL (OUTPATIENT)
Age: 64
End: 2024-01-29

## 2024-01-29 RX ORDER — FLUTICASONE FUROATE AND VILANTEROL TRIFENATATE 200; 25 UG/1; UG/1
200-25 POWDER RESPIRATORY (INHALATION)
Qty: 180 | Refills: 3 | Status: ACTIVE | COMMUNITY
Start: 2022-03-18 | End: 1900-01-01

## 2024-02-05 ENCOUNTER — APPOINTMENT (OUTPATIENT)
Dept: OBGYN | Facility: CLINIC | Age: 64
End: 2024-02-05
Payer: COMMERCIAL

## 2024-02-05 ENCOUNTER — ASOB RESULT (OUTPATIENT)
Age: 64
End: 2024-02-05

## 2024-02-05 ENCOUNTER — LABORATORY RESULT (OUTPATIENT)
Age: 64
End: 2024-02-05

## 2024-02-05 VITALS
WEIGHT: 142 LBS | HEART RATE: 92 BPM | SYSTOLIC BLOOD PRESSURE: 147 MMHG | HEIGHT: 61 IN | DIASTOLIC BLOOD PRESSURE: 82 MMHG | BODY MASS INDEX: 26.81 KG/M2

## 2024-02-05 DIAGNOSIS — R10.32 RIGHT LOWER QUADRANT PAIN: ICD-10-CM

## 2024-02-05 DIAGNOSIS — R10.31 RIGHT LOWER QUADRANT PAIN: ICD-10-CM

## 2024-02-05 PROCEDURE — 76830 TRANSVAGINAL US NON-OB: CPT

## 2024-02-05 PROCEDURE — 76856 US EXAM PELVIC COMPLETE: CPT | Mod: 59

## 2024-02-05 PROCEDURE — 99213 OFFICE O/P EST LOW 20 MIN: CPT

## 2024-02-05 NOTE — PLAN
[FreeTextEntry1] : Patient is a 63-year-old  3 para 2-0-1-2 last menstrual period age 38 Patient presents complaining of lower pelvic discomfort over the past 2 weeks Patient denies any vaginal bleeding Physical exam reveals a well-developed well-nourished female no apparent distress,, BMI 26 Pelvic exam shows normal female external genitalia, vagina no lesions, cervix appropriate size nontender, uterus anteverted normal size nontender, adnexa no mass nontender Urine sample for urine culture obtained and sent Pelvic sonogram Uterus 3.18 x 4.46 x 3.47 Cervical length 2.64 cm Endometrium 1.9 mm Right ovary 1.74 x 1.83 x 1.3 Left ovary not seen No adnexal mass No free fluid seen Bowel appears to be distended and full of stool Results discussed with patient Patient to see GI for further evaluation Questions answered Patient that she understands  Patient was seen and examined and it was present as a chaperone for the entire assessment and examination of this patient in follow-up patient was seen in the office to discuss the sono results

## 2024-02-05 NOTE — HISTORY OF PRESENT ILLNESS
[FreeTextEntry1] : 63-year-old  3 para 2-0-1-2 last menstrual period age 38 Patient presents for evaluation complaining of lower pelvic discomfort bilaterally over the past couple of weeks Patient denies any vaginal bleeding or urinary symptoms

## 2024-02-06 LAB
APPEARANCE: CLEAR
BILIRUBIN URINE: NEGATIVE
BLOOD URINE: NEGATIVE
COLOR: YELLOW
GLUCOSE QUALITATIVE U: NEGATIVE MG/DL
KETONES URINE: NEGATIVE MG/DL
LEUKOCYTE ESTERASE URINE: ABNORMAL
NITRITE URINE: NEGATIVE
PH URINE: 6.5
PROTEIN URINE: NEGATIVE MG/DL
SPECIFIC GRAVITY URINE: 1.01
UROBILINOGEN URINE: 0.2 MG/DL

## 2024-02-08 ENCOUNTER — APPOINTMENT (OUTPATIENT)
Dept: PULMONOLOGY | Facility: CLINIC | Age: 64
End: 2024-02-08
Payer: COMMERCIAL

## 2024-02-08 VITALS
RESPIRATION RATE: 16 BRPM | DIASTOLIC BLOOD PRESSURE: 70 MMHG | WEIGHT: 142 LBS | HEART RATE: 86 BPM | SYSTOLIC BLOOD PRESSURE: 126 MMHG | BODY MASS INDEX: 26.81 KG/M2 | HEIGHT: 61 IN | OXYGEN SATURATION: 95 %

## 2024-02-08 DIAGNOSIS — N39.0 URINARY TRACT INFECTION, SITE NOT SPECIFIED: ICD-10-CM

## 2024-02-08 DIAGNOSIS — F17.200 NICOTINE DEPENDENCE, UNSPECIFIED, UNCOMPLICATED: ICD-10-CM

## 2024-02-08 DIAGNOSIS — J44.9 CHRONIC OBSTRUCTIVE PULMONARY DISEASE, UNSPECIFIED: ICD-10-CM

## 2024-02-08 LAB — BACTERIA UR CULT: ABNORMAL

## 2024-02-08 PROCEDURE — G0296 VISIT TO DETERM LDCT ELIG: CPT

## 2024-02-08 PROCEDURE — 71271 CT THORAX LUNG CANCER SCR C-: CPT

## 2024-02-08 PROCEDURE — 99406 BEHAV CHNG SMOKING 3-10 MIN: CPT

## 2024-02-08 PROCEDURE — 99214 OFFICE O/P EST MOD 30 MIN: CPT

## 2024-02-08 RX ORDER — SULFAMETHOXAZOLE AND TRIMETHOPRIM 800; 160 MG/1; MG/1
800-160 TABLET ORAL
Qty: 14 | Refills: 0 | Status: ACTIVE | COMMUNITY
Start: 2024-02-08 | End: 1900-01-01

## 2024-02-08 NOTE — CONSULT LETTER
[Dear  ___] : Dear  [unfilled], [Consult Letter:] : I had the pleasure of evaluating your patient, [unfilled]. [Please see my note below.] : Please see my note below. [Consult Closing:] : Thank you very much for allowing me to participate in the care of this patient.  If you have any questions, please do not hesitate to contact me. [Sincerely,] : Sincerely, [DrDanial  ___] : Dr. MARTINEZ

## 2024-02-08 NOTE — COUNSELING
[Cessation strategies including cessation program discussed] : Cessation strategies including cessation program discussed [Use of nicotine replacement therapies and other medications discussed] : Use of nicotine replacement therapies and other medications discussed [Encouraged to pick a quit date and identify support needed to quit] : Encouraged to pick a quit date and identify support needed to quit [ - Annual Lung Cancer Screening/Share Decision Making Discussion] : Annual Lung Cancer Screening/Share Decision Making Discussion. (I have advised this patient to have a Low Dose CT (LDCT) scan of the lungs and have discussed the following with the patient in a shared decision making discussion:   Benefits of Detection and Early Treatment: There is adequate evidence that annual screening for lung cancer with LDCT in a population of high-risk persons can prevent a substantial number of lung cancer-related deaths. The magnitude of benefit depends on the individual patient's risk for lung cancer, as those who are at highest risk are most likely to benefit. Screening cannot prevent most lung cancer-related deaths, and does not replace smoking cessation. Harms of Detection and Early Intervention and Treatment: The harms associated with LDCT screening include false-negative and false-positive results, incidental findings, over diagnosis, and radiation exposure. False-positive LDCT results occur in a substantial proportion of screened persons; 95% of all positive results do not lead to a diagnosis of cancer. In a high-quality screening program, further imaging can resolve most false-positive results; however, some patients may require invasive procedures. Radiation harms, including cancer resulting from cumulative exposure to radiation, vary depending on the age at the start of screening; the number of scans received; and the person's exposure to other sources of radiation, particularly other medical imaging.)

## 2024-04-11 ENCOUNTER — NON-APPOINTMENT (OUTPATIENT)
Age: 64
End: 2024-04-11

## 2024-04-11 VITALS — BODY MASS INDEX: 26.81 KG/M2 | HEIGHT: 61 IN | WEIGHT: 142 LBS

## 2024-04-11 DIAGNOSIS — F17.210 NICOTINE DEPENDENCE, CIGARETTES, UNCOMPLICATED: ICD-10-CM

## 2024-04-11 NOTE — HISTORY OF PRESENT ILLNESS
[Current] : Current [TextBox_13] : Ms. GARCÍA is a 63 year old female with a history of HTN, COPD and emphysema. Reviewed and confirmed that the patient meets screening eligibility criteria: 63 years old Smoking Status: Current Smoker Number of pack(s) per day: 1 ppd x 30 years, 3/4 ppd x 15 years Number of years smoked: 45 Number of pack years smokin No symptoms of lung cancer, including new cough, change in cough, hemoptysis, and unintentional weight loss. No personal history of lung cancer. History of lung cancer in a first degree relative, her father. No history of occupational exposures. [PacksperYear] : 41

## 2024-04-22 ENCOUNTER — APPOINTMENT (OUTPATIENT)
Dept: CT IMAGING | Facility: CLINIC | Age: 64
End: 2024-04-22
Payer: COMMERCIAL

## 2024-04-22 ENCOUNTER — OUTPATIENT (OUTPATIENT)
Dept: OUTPATIENT SERVICES | Facility: HOSPITAL | Age: 64
LOS: 1 days | End: 2024-04-22
Payer: COMMERCIAL

## 2024-04-22 DIAGNOSIS — F17.200 NICOTINE DEPENDENCE, UNSPECIFIED, UNCOMPLICATED: ICD-10-CM

## 2024-04-22 DIAGNOSIS — F17.210 NICOTINE DEPENDENCE, CIGARETTES, UNCOMPLICATED: ICD-10-CM

## 2024-04-22 PROCEDURE — 71271 CT THORAX LUNG CANCER SCR C-: CPT

## 2024-04-22 PROCEDURE — 71271 CT THORAX LUNG CANCER SCR C-: CPT | Mod: 26

## 2024-04-29 NOTE — HISTORY OF PRESENT ILLNESS
[de-identified] : THe patient is without pain, drainage, fevers or chills.  She states she still has a scab over the upper back wound.   Severe protein-calorie malnutrition

## 2024-04-30 NOTE — RESULTS/DATA
[TextEntry] : LDCT 4/22/24: Unchanged previously seen bilateral pulmonary nodules measuring up to 0.3 cm at lingula and an 0.4 cm at right lower lobe. No new or enlarging nodules.

## 2024-04-30 NOTE — END OF VISIT
[Time Spent: ___ minutes] : I have spent [unfilled] minutes of time on the encounter. [>50% of the face to face encounter time was spent on counseling and/or coordination of care for ___] : Greater than 50% of the face to face encounter time was spent on counseling and/or coordination of care for [unfilled] [FreeTextEntry3] : My first visit with this patient I reviewed all recent notes, orders, lab results and images for 15 minutes on all available platforms (including [employer], [employer], [employer], and [employer] Epic prior to this visit and made notes. Reviewed PFT , LDCT and Pathophysiology, associated medical conditions, PALMA and Rx options for dyspnea and COPD.

## 2024-04-30 NOTE — HISTORY OF PRESENT ILLNESS
[TextBox_4] : 1/25/23 Patient going tomorrow for removal of several cysts on her neck. This will be done in the surgery center. Pulmonary clearance was requested. The patient has mild to moderate COPD. Generally has been feeling well on Breo which she takes at night. Denies coughing or wheezing or need for rescue inhaler recently. She does have some postnasal drip for which she takes fluticasone nasal spray. Smoking about one half pack of cigarettes per day.  2/8/24 My first visit with this patient  I reviewed all recent notes, orders, lab results and images for 15 minutes on all available platforms (including [employer], [employer], [employer], and [employer] Epic prior to this visit and made notes.  Nicotine dependence - 40 pack year smoker Moderate COPD Didn't like Trelegy No Glaucoma Breo cost increasing  [facility]  5 minute discussion of smoking related illness, smoke cessation methods and smoke cessation resources

## 2024-11-11 ENCOUNTER — APPOINTMENT (OUTPATIENT)
Dept: OBGYN | Facility: CLINIC | Age: 64
End: 2024-11-11
Payer: COMMERCIAL

## 2024-11-11 VITALS
HEIGHT: 61 IN | WEIGHT: 139 LBS | DIASTOLIC BLOOD PRESSURE: 77 MMHG | SYSTOLIC BLOOD PRESSURE: 151 MMHG | HEART RATE: 89 BPM | RESPIRATION RATE: 16 BRPM | BODY MASS INDEX: 26.24 KG/M2

## 2024-11-11 DIAGNOSIS — Z11.51 ENCOUNTER FOR SCREENING FOR HUMAN PAPILLOMAVIRUS (HPV): ICD-10-CM

## 2024-11-11 DIAGNOSIS — Z01.419 ENCOUNTER FOR GYNECOLOGICAL EXAMINATION (GENERAL) (ROUTINE) W/OUT ABNORMAL FINDINGS: ICD-10-CM

## 2024-11-11 DIAGNOSIS — Z12.39 ENCOUNTER FOR OTHER SCREENING FOR MALIGNANT NEOPLASM OF BREAST: ICD-10-CM

## 2024-11-11 DIAGNOSIS — Z80.3 FAMILY HISTORY OF MALIGNANT NEOPLASM OF BREAST: ICD-10-CM

## 2024-11-11 DIAGNOSIS — R10.32 RIGHT LOWER QUADRANT PAIN: ICD-10-CM

## 2024-11-11 DIAGNOSIS — R10.31 RIGHT LOWER QUADRANT PAIN: ICD-10-CM

## 2024-11-11 DIAGNOSIS — Z87.09 PERSONAL HISTORY OF OTHER DISEASES OF THE RESPIRATORY SYSTEM: ICD-10-CM

## 2024-11-11 PROCEDURE — 99459 PELVIC EXAMINATION: CPT

## 2024-11-11 PROCEDURE — 99396 PREV VISIT EST AGE 40-64: CPT

## 2024-12-31 ENCOUNTER — RX RENEWAL (OUTPATIENT)
Age: 64
End: 2024-12-31

## 2025-02-26 ENCOUNTER — APPOINTMENT (OUTPATIENT)
Dept: SURGERY | Facility: CLINIC | Age: 65
End: 2025-02-26
Payer: COMMERCIAL

## 2025-02-26 VITALS
OXYGEN SATURATION: 98 % | TEMPERATURE: 97.7 F | RESPIRATION RATE: 16 BRPM | SYSTOLIC BLOOD PRESSURE: 159 MMHG | HEIGHT: 60.5 IN | DIASTOLIC BLOOD PRESSURE: 85 MMHG | BODY MASS INDEX: 26.2 KG/M2 | HEART RATE: 93 BPM | WEIGHT: 137 LBS

## 2025-02-26 VITALS — SYSTOLIC BLOOD PRESSURE: 140 MMHG | DIASTOLIC BLOOD PRESSURE: 82 MMHG

## 2025-02-26 DIAGNOSIS — L72.3 SEBACEOUS CYST: ICD-10-CM

## 2025-02-26 DIAGNOSIS — L08.9 SEBACEOUS CYST: ICD-10-CM

## 2025-02-26 PROCEDURE — 99214 OFFICE O/P EST MOD 30 MIN: CPT

## 2025-02-26 RX ORDER — CLINDAMYCIN HYDROCHLORIDE 300 MG/1
300 CAPSULE ORAL EVERY 6 HOURS
Qty: 40 | Refills: 1 | Status: ACTIVE | COMMUNITY
Start: 2025-02-26 | End: 1900-01-01

## 2025-03-12 ENCOUNTER — APPOINTMENT (OUTPATIENT)
Dept: SURGERY | Facility: CLINIC | Age: 65
End: 2025-03-12
Payer: COMMERCIAL

## 2025-03-12 VITALS
TEMPERATURE: 97.8 F | OXYGEN SATURATION: 95 % | HEART RATE: 81 BPM | BODY MASS INDEX: 25.63 KG/M2 | DIASTOLIC BLOOD PRESSURE: 84 MMHG | HEIGHT: 60.5 IN | WEIGHT: 134 LBS | SYSTOLIC BLOOD PRESSURE: 144 MMHG | RESPIRATION RATE: 16 BRPM

## 2025-03-12 DIAGNOSIS — L72.3 SEBACEOUS CYST: ICD-10-CM

## 2025-03-12 DIAGNOSIS — L08.9 SEBACEOUS CYST: ICD-10-CM

## 2025-03-12 PROCEDURE — 99213 OFFICE O/P EST LOW 20 MIN: CPT

## 2025-03-31 ENCOUNTER — OFFICE (OUTPATIENT)
Dept: URBAN - METROPOLITAN AREA CLINIC 104 | Facility: CLINIC | Age: 65
Setting detail: OPHTHALMOLOGY
End: 2025-03-31
Payer: COMMERCIAL

## 2025-03-31 DIAGNOSIS — H40.033: ICD-10-CM

## 2025-03-31 DIAGNOSIS — H35.363: ICD-10-CM

## 2025-03-31 PROCEDURE — 92250 FUNDUS PHOTOGRAPHY W/I&R: CPT | Performed by: OPHTHALMOLOGY

## 2025-03-31 PROCEDURE — 99203 OFFICE O/P NEW LOW 30 MIN: CPT | Performed by: OPHTHALMOLOGY

## 2025-03-31 PROCEDURE — 92020 GONIOSCOPY: CPT | Performed by: OPHTHALMOLOGY

## 2025-03-31 ASSESSMENT — REFRACTION_CURRENTRX
OD_OVR_VA: 20/
OS_OVR_VA: 20/
OD_CYLINDER: 0.00
OS_CYLINDER: -0.25
OD_SPHERE: +3.25
OS_SPHERE: +3.25
OS_AXIS: 094
OD_AXIS: 0

## 2025-03-31 ASSESSMENT — VISUAL ACUITY
OD_BCVA: 20/20-2
OS_BCVA: 20/30

## 2025-03-31 ASSESSMENT — CONFRONTATIONAL VISUAL FIELD TEST (CVF)
OD_FINDINGS: FULL
OS_FINDINGS: FULL

## 2025-04-04 ENCOUNTER — APPOINTMENT (OUTPATIENT)
Dept: PULMONOLOGY | Facility: CLINIC | Age: 65
End: 2025-04-04
Payer: COMMERCIAL

## 2025-04-04 VITALS
HEART RATE: 84 BPM | OXYGEN SATURATION: 96 % | SYSTOLIC BLOOD PRESSURE: 130 MMHG | RESPIRATION RATE: 16 BRPM | DIASTOLIC BLOOD PRESSURE: 80 MMHG

## 2025-04-04 VITALS — HEIGHT: 60.5 IN | BODY MASS INDEX: 25.82 KG/M2 | WEIGHT: 135 LBS

## 2025-04-04 DIAGNOSIS — R91.8 OTHER NONSPECIFIC ABNORMAL FINDING OF LUNG FIELD: ICD-10-CM

## 2025-04-04 DIAGNOSIS — J44.9 CHRONIC OBSTRUCTIVE PULMONARY DISEASE, UNSPECIFIED: ICD-10-CM

## 2025-04-04 PROCEDURE — 94010 BREATHING CAPACITY TEST: CPT

## 2025-04-04 PROCEDURE — 94727 GAS DIL/WSHOT DETER LNG VOL: CPT

## 2025-04-04 PROCEDURE — 85018 HEMOGLOBIN: CPT | Mod: QW

## 2025-04-04 PROCEDURE — 94729 DIFFUSING CAPACITY: CPT

## 2025-04-04 PROCEDURE — 99204 OFFICE O/P NEW MOD 45 MIN: CPT | Mod: 25

## 2025-04-04 PROCEDURE — 99407 BEHAV CHNG SMOKING > 10 MIN: CPT

## 2025-04-04 RX ORDER — NICOTINE 21 MG/24HR
21 PATCH, TRANSDERMAL 24 HOURS TRANSDERMAL DAILY
Qty: 30 | Refills: 3 | Status: ACTIVE | COMMUNITY
Start: 2025-04-04 | End: 1900-01-01

## 2025-04-07 ENCOUNTER — NON-APPOINTMENT (OUTPATIENT)
Age: 65
End: 2025-04-07

## 2025-04-08 ENCOUNTER — NON-APPOINTMENT (OUTPATIENT)
Age: 65
End: 2025-04-08

## 2025-04-08 VITALS — BODY MASS INDEX: 25.82 KG/M2 | HEIGHT: 60.5 IN | WEIGHT: 135 LBS

## 2025-04-08 DIAGNOSIS — F17.210 NICOTINE DEPENDENCE, CIGARETTES, UNCOMPLICATED: ICD-10-CM

## 2025-04-08 DIAGNOSIS — F17.200 NICOTINE DEPENDENCE, UNSPECIFIED, UNCOMPLICATED: ICD-10-CM

## 2025-04-09 ENCOUNTER — APPOINTMENT (OUTPATIENT)
Dept: SURGERY | Facility: CLINIC | Age: 65
End: 2025-04-09
Payer: COMMERCIAL

## 2025-04-09 VITALS
BODY MASS INDEX: 26.2 KG/M2 | HEART RATE: 70 BPM | WEIGHT: 137 LBS | TEMPERATURE: 97.6 F | DIASTOLIC BLOOD PRESSURE: 92 MMHG | SYSTOLIC BLOOD PRESSURE: 158 MMHG | OXYGEN SATURATION: 96 % | RESPIRATION RATE: 16 BRPM | HEIGHT: 60.5 IN

## 2025-04-09 VITALS — SYSTOLIC BLOOD PRESSURE: 152 MMHG | DIASTOLIC BLOOD PRESSURE: 85 MMHG

## 2025-04-09 DIAGNOSIS — L72.3 SEBACEOUS CYST: ICD-10-CM

## 2025-04-09 PROCEDURE — 99213 OFFICE O/P EST LOW 20 MIN: CPT

## 2025-04-14 ENCOUNTER — OFFICE (OUTPATIENT)
Dept: URBAN - METROPOLITAN AREA CLINIC 104 | Facility: CLINIC | Age: 65
Setting detail: OPHTHALMOLOGY
End: 2025-04-14
Payer: COMMERCIAL

## 2025-04-14 DIAGNOSIS — H40.031: ICD-10-CM

## 2025-04-14 PROBLEM — H40.032 ANATOMICAL NARROW ANGLE; RIGHT EYE, LEFT EYE: Status: ACTIVE | Noted: 2025-04-14

## 2025-04-14 PROCEDURE — 66761 REVISION OF IRIS: CPT | Mod: RT | Performed by: OPHTHALMOLOGY

## 2025-04-14 ASSESSMENT — REFRACTION_CURRENTRX
OS_CYLINDER: -0.25
OD_SPHERE: +3.25
OS_OVR_VA: 20/
OD_AXIS: 0
OD_CYLINDER: 0.00
OS_AXIS: 094
OS_SPHERE: +3.25
OD_OVR_VA: 20/

## 2025-04-14 ASSESSMENT — KERATOMETRY
OD_AXISANGLE_DEGREES: 102
OS_AXISANGLE_DEGREES: 069
OS_K1POWER_DIOPTERS: 47.54
OS_K2POWER_DIOPTERS: 48.35
OD_K2POWER_DIOPTERS: 47.60
OD_K1POWER_DIOPTERS: 47.47

## 2025-04-14 ASSESSMENT — REFRACTION_AUTOREFRACTION
OD_CYLINDER: -0.25
OS_AXIS: 093
OD_AXIS: 090
OS_SPHERE: +1.25
OD_SPHERE: +1.00
OS_CYLINDER: -0.75

## 2025-04-14 ASSESSMENT — VISUAL ACUITY
OD_BCVA: 20/30-1
OS_BCVA: 20/30

## 2025-04-14 ASSESSMENT — CONFRONTATIONAL VISUAL FIELD TEST (CVF)
OS_FINDINGS: FULL
OD_FINDINGS: FULL

## 2025-04-25 ENCOUNTER — APPOINTMENT (OUTPATIENT)
Dept: CT IMAGING | Facility: CLINIC | Age: 65
End: 2025-04-25

## 2025-04-25 ENCOUNTER — OUTPATIENT (OUTPATIENT)
Dept: OUTPATIENT SERVICES | Facility: HOSPITAL | Age: 65
LOS: 1 days | End: 2025-04-25
Payer: COMMERCIAL

## 2025-04-25 DIAGNOSIS — F17.200 NICOTINE DEPENDENCE, UNSPECIFIED, UNCOMPLICATED: ICD-10-CM

## 2025-04-25 PROCEDURE — 71271 CT THORAX LUNG CANCER SCR C-: CPT

## 2025-04-25 PROCEDURE — 71271 CT THORAX LUNG CANCER SCR C-: CPT | Mod: 26

## 2025-05-05 ENCOUNTER — OFFICE (OUTPATIENT)
Dept: URBAN - METROPOLITAN AREA CLINIC 104 | Facility: CLINIC | Age: 65
Setting detail: OPHTHALMOLOGY
End: 2025-05-05
Payer: COMMERCIAL

## 2025-05-05 DIAGNOSIS — H40.032: ICD-10-CM

## 2025-05-05 PROCEDURE — 66761 REVISION OF IRIS: CPT | Mod: LT | Performed by: OPHTHALMOLOGY

## 2025-05-05 ASSESSMENT — REFRACTION_CURRENTRX
OD_OVR_VA: 20/
OD_SPHERE: +3.25
OD_AXIS: 0
OD_CYLINDER: 0.00
OS_SPHERE: +3.25
OS_AXIS: 094
OS_OVR_VA: 20/
OS_CYLINDER: -0.25

## 2025-05-05 ASSESSMENT — REFRACTION_AUTOREFRACTION
OD_SPHERE: +1.00
OS_CYLINDER: -0.75
OS_SPHERE: +1.25
OD_CYLINDER: -0.25
OD_AXIS: 090
OS_AXIS: 093

## 2025-05-05 ASSESSMENT — CONFRONTATIONAL VISUAL FIELD TEST (CVF)
OD_FINDINGS: FULL
OS_FINDINGS: FULL

## 2025-05-05 ASSESSMENT — VISUAL ACUITY
OS_BCVA: 20/30-2
OD_BCVA: 20/30-1

## 2025-05-05 ASSESSMENT — KERATOMETRY
OS_K2POWER_DIOPTERS: 48.35
OD_AXISANGLE_DEGREES: 102
OD_K2POWER_DIOPTERS: 47.60
OD_K1POWER_DIOPTERS: 47.47
OS_AXISANGLE_DEGREES: 069
OS_K1POWER_DIOPTERS: 47.54

## 2025-05-07 ENCOUNTER — NON-APPOINTMENT (OUTPATIENT)
Age: 65
End: 2025-05-07

## 2025-05-07 DIAGNOSIS — R93.89 ABNORMAL FINDINGS ON DIAGNOSTIC IMAGING OF OTHER SPECIFIED BODY STRUCTURES: ICD-10-CM

## 2025-06-02 ENCOUNTER — OFFICE (OUTPATIENT)
Dept: URBAN - METROPOLITAN AREA CLINIC 104 | Facility: CLINIC | Age: 65
Setting detail: OPHTHALMOLOGY
End: 2025-06-02
Payer: COMMERCIAL

## 2025-06-02 DIAGNOSIS — H25.13: ICD-10-CM

## 2025-06-02 DIAGNOSIS — H35.363: ICD-10-CM

## 2025-06-02 DIAGNOSIS — H40.033: ICD-10-CM

## 2025-06-02 DIAGNOSIS — H25.813: ICD-10-CM

## 2025-06-02 PROCEDURE — 92134 CPTRZ OPH DX IMG PST SGM RTA: CPT | Performed by: OPTOMETRIST

## 2025-06-02 PROCEDURE — 92014 COMPRE OPH EXAM EST PT 1/>: CPT | Performed by: OPTOMETRIST

## 2025-06-02 ASSESSMENT — CONFRONTATIONAL VISUAL FIELD TEST (CVF)
OD_FINDINGS: FULL
OS_FINDINGS: FULL

## 2025-06-02 ASSESSMENT — REFRACTION_CURRENTRX
OD_OVR_VA: 20/
OS_OVR_VA: 20/

## 2025-06-02 ASSESSMENT — KERATOMETRY
OS_AXISANGLE_DEGREES: 054
OD_K1POWER_DIOPTERS: 47.14
OS_K2POWER_DIOPTERS: 48.01
OS_K1POWER_DIOPTERS: 47.54
OD_K2POWER_DIOPTERS: 47.67
OD_AXISANGLE_DEGREES: 159

## 2025-06-02 ASSESSMENT — VISUAL ACUITY
OD_BCVA: 20/25-2
OS_BCVA: 20/30+2